# Patient Record
Sex: MALE | Race: WHITE | NOT HISPANIC OR LATINO | Employment: OTHER | URBAN - NONMETROPOLITAN AREA
[De-identification: names, ages, dates, MRNs, and addresses within clinical notes are randomized per-mention and may not be internally consistent; named-entity substitution may affect disease eponyms.]

---

## 2017-07-25 ENCOUNTER — TRANSCRIBE ORDERS (OUTPATIENT)
Dept: ADMINISTRATIVE | Facility: HOSPITAL | Age: 62
End: 2017-07-25

## 2017-07-25 ENCOUNTER — HOSPITAL ENCOUNTER (OUTPATIENT)
Dept: GENERAL RADIOLOGY | Facility: HOSPITAL | Age: 62
Discharge: HOME | End: 2017-07-25
Admitting: NURSE PRACTITIONER

## 2017-07-25 DIAGNOSIS — M25.562 LEFT KNEE PAIN, UNSPECIFIED CHRONICITY: ICD-10-CM

## 2017-07-25 DIAGNOSIS — M25.362 INSTABILITY OF KNEE JOINT, LEFT: ICD-10-CM

## 2017-07-25 DIAGNOSIS — M25.362 INSTABILITY OF KNEE JOINT, LEFT: Primary | ICD-10-CM

## 2017-07-25 PROCEDURE — 73562 X-RAY EXAM OF KNEE 3: CPT

## 2017-08-09 DIAGNOSIS — M25.562 PAIN IN BOTH KNEES, UNSPECIFIED CHRONICITY: Primary | ICD-10-CM

## 2017-08-09 DIAGNOSIS — M25.561 PAIN IN BOTH KNEES, UNSPECIFIED CHRONICITY: Primary | ICD-10-CM

## 2017-08-10 ENCOUNTER — OFFICE VISIT (OUTPATIENT)
Dept: ORTHOPEDIC SURGERY | Facility: CLINIC | Age: 62
End: 2017-08-10

## 2017-08-10 VITALS — RESPIRATION RATE: 16 BRPM | WEIGHT: 267.3 LBS | HEIGHT: 71 IN | BODY MASS INDEX: 37.42 KG/M2

## 2017-08-10 DIAGNOSIS — M17.10 OSTEOARTHROSIS, LOCALIZED, PRIMARY, KNEE, UNSPECIFIED LATERALITY: Primary | ICD-10-CM

## 2017-08-10 PROCEDURE — 99203 OFFICE O/P NEW LOW 30 MIN: CPT | Performed by: ORTHOPAEDIC SURGERY

## 2017-08-10 RX ORDER — DICLOFENAC SODIUM 75 MG/1
TABLET, DELAYED RELEASE ORAL
Refills: 1 | COMMUNITY
Start: 2017-07-20 | End: 2018-04-24

## 2017-08-10 RX ORDER — LISINOPRIL 10 MG/1
TABLET ORAL
Refills: 0 | COMMUNITY
Start: 2017-07-20 | End: 2018-04-24

## 2017-08-10 NOTE — PROGRESS NOTES
Subjective   Patient ID: Praveen King is a 62 y.o. male  Pain and Consult of the Left Knee (Patient is here today to be seen at the request of PCP.  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  Denies acute trauma locking buckling or giving way.  Denies paresthesias back or hip pain.  Has history of eczema, has Been given a prescription of Voltaren but not yet taken it by his PCP.    Review of Systems   Constitutional: Negative for diaphoresis, fever and unexpected weight change.   HENT: Negative for dental problem and sore throat.    Eyes: Negative for visual disturbance.   Respiratory: Negative for shortness of breath.    Cardiovascular: Negative for chest pain.   Gastrointestinal: Negative for abdominal pain, constipation, diarrhea, nausea and vomiting.   Genitourinary: Negative for difficulty urinating and frequency.   Musculoskeletal: Positive for arthralgias.   Neurological: Negative for headaches.   Hematological: Does not bruise/bleed easily.   All other systems reviewed and are negative.      Past Medical History:   Diagnosis Date   • Hypertension         Past Surgical History:   Procedure Laterality Date   • ADENOIDECTOMY     • NASAL FRACTURE CLOSED REDUCTION     • TONSILLECTOMY         Family History   Problem Relation Age of Onset   • Asthma Mother    • Cancer Father        Social History     Social History   • Marital status:      Spouse name: N/A   • Number of children: N/A   • Years of education: N/A     Occupational History   • Not on file.     Social History Main Topics   • Smoking status: Former Smoker     Years: 10.00   • Smokeless tobacco: Never Used   • Alcohol use No   • Drug use: No   • Sexual activity: Defer     Other Topics Concern   • Not on file  "    Social History Narrative       No Known Allergies    Objective   Resp 16  Ht 71\" (180.3 cm)  Wt 267 lb 4.8 oz (121 kg)  BMI 37.28 kg/m2   Physical Exam  Constitutional: He is oriented to person, place, and time. He appears well-developed and well-nourished.   HENT:   Head: Normocephalic and atraumatic.   Eyes: EOM are normal. Pupils are equal, round, and reactive to light.   Neck: Normal range of motion. Neck supple.   Cardiovascular: Normal rate.    Pulmonary/Chest: Effort normal and breath sounds normal.   Abdominal: Soft.   Neurological: He is alert and oriented to person, place, and time.   Skin: Skin is warm and dry.   Psychiatric: He has a normal mood and affect.   Nursing note and vitals reviewed.       Ortho Exam   Left and right knees:  Skin appears without erythema, normal standing mechanical alignment, full range of motion, negative signs of instability with negative Lachman and posterior drawer instability signs, negative medial and lateral collateral instability, Maria Luz sign negative, extensor mechanism nontender with good strength, negative patellofemoral crepitus, calf supple, no associated ankle edema, intact neurovascular status to the lower leg, no knee pain elicited with ipsilateral hip range of motion.  Assessment/Plan   Review of Radiographic Studies:    Radiographic images today of affected area I personally viewed and showed no sign of acute fracture or dislocation.      Procedures     Praveen was seen today for pain, consult and pain.    Diagnoses and all orders for this visit:    Osteoarthrosis, localized, primary, knee, unspecified laterality  -     Ambulatory Referral to Physical Therapy Evaluate and treat     Physical therapy referral given      Recommendations/Plan:   Work/Activity Status: May perform usual activities as tolerated    Patient agreeable to call or return sooner for any concerns.       Needed this time for further imaging, counseled patient regarding weight loss home " exercise and recommendation for quadriceps strengthening and hamstring strengthening left and right knees.      Impression:  Minimal osteoarthritic change left and right knees with quadriceps weakness  Plan:  Refer to therapy recheck in 6 weeks

## 2017-08-16 ENCOUNTER — APPOINTMENT (OUTPATIENT)
Dept: LAB | Facility: HOSPITAL | Age: 62
End: 2017-08-16

## 2017-08-16 ENCOUNTER — TRANSCRIBE ORDERS (OUTPATIENT)
Dept: ADMINISTRATIVE | Facility: HOSPITAL | Age: 62
End: 2017-08-16

## 2017-08-16 DIAGNOSIS — I15.9 SECONDARY HYPERTENSION: Primary | ICD-10-CM

## 2017-08-16 LAB
ALBUMIN SERPL-MCNC: 4.6 G/DL (ref 3.5–5)
ALBUMIN/GLOB SERPL: 1.5 G/DL (ref 1–2)
ALP SERPL-CCNC: 93 U/L (ref 38–126)
ALT SERPL W P-5'-P-CCNC: 79 U/L (ref 13–69)
ANION GAP SERPL CALCULATED.3IONS-SCNC: 17.2 MMOL/L
AST SERPL-CCNC: 38 U/L (ref 15–46)
BASOPHILS # BLD AUTO: 0.03 10*3/MM3 (ref 0–0.2)
BASOPHILS NFR BLD AUTO: 0.4 % (ref 0–2.5)
BILIRUB SERPL-MCNC: 0.8 MG/DL (ref 0.2–1.3)
BILIRUB UR QL STRIP: NEGATIVE
BUN BLD-MCNC: 11 MG/DL (ref 7–20)
BUN/CREAT SERPL: 18.3 (ref 6.3–21.9)
CALCIUM SPEC-SCNC: 9.9 MG/DL (ref 8.4–10.2)
CHLORIDE SERPL-SCNC: 102 MMOL/L (ref 98–107)
CHOLEST SERPL-MCNC: 220 MG/DL (ref 0–199)
CLARITY UR: ABNORMAL
CO2 SERPL-SCNC: 29 MMOL/L (ref 26–30)
COLOR UR: YELLOW
CREAT BLD-MCNC: 0.6 MG/DL (ref 0.6–1.3)
DEPRECATED RDW RBC AUTO: 46.2 FL (ref 37–54)
EOSINOPHIL # BLD AUTO: 0.09 10*3/MM3 (ref 0–0.7)
EOSINOPHIL NFR BLD AUTO: 1.2 % (ref 0–7)
ERYTHROCYTE [DISTWIDTH] IN BLOOD BY AUTOMATED COUNT: 13.6 % (ref 11.5–14.5)
GFR SERPL CREATININE-BSD FRML MDRD: 137 ML/MIN/1.73
GLOBULIN UR ELPH-MCNC: 3 GM/DL
GLUCOSE BLD-MCNC: 104 MG/DL (ref 74–98)
GLUCOSE UR STRIP-MCNC: NEGATIVE MG/DL
HCT VFR BLD AUTO: 48.9 % (ref 42–52)
HDLC SERPL-MCNC: 41 MG/DL (ref 40–60)
HGB BLD-MCNC: 16.1 G/DL (ref 14–18)
HGB UR QL STRIP.AUTO: NEGATIVE
IMM GRANULOCYTES # BLD: 0.03 10*3/MM3 (ref 0–0.06)
IMM GRANULOCYTES NFR BLD: 0.4 % (ref 0–0.6)
KETONES UR QL STRIP: NEGATIVE
LDLC SERPL CALC-MCNC: 154 MG/DL (ref 0–99)
LDLC/HDLC SERPL: 3.76 {RATIO}
LEUKOCYTE ESTERASE UR QL STRIP.AUTO: NEGATIVE
LYMPHOCYTES # BLD AUTO: 2.28 10*3/MM3 (ref 0.6–3.4)
LYMPHOCYTES NFR BLD AUTO: 30.7 % (ref 10–50)
MCH RBC QN AUTO: 30.3 PG (ref 27–31)
MCHC RBC AUTO-ENTMCNC: 32.9 G/DL (ref 30–37)
MCV RBC AUTO: 91.9 FL (ref 80–94)
MONOCYTES # BLD AUTO: 0.64 10*3/MM3 (ref 0–0.9)
MONOCYTES NFR BLD AUTO: 8.6 % (ref 0–12)
NEUTROPHILS # BLD AUTO: 4.35 10*3/MM3 (ref 2–6.9)
NEUTROPHILS NFR BLD AUTO: 58.7 % (ref 37–80)
NITRITE UR QL STRIP: NEGATIVE
NRBC BLD MANUAL-RTO: 0 /100 WBC (ref 0–0)
PH UR STRIP.AUTO: 5.5 [PH] (ref 5–8)
PLATELET # BLD AUTO: 337 10*3/MM3 (ref 130–400)
PMV BLD AUTO: 9.6 FL (ref 6–12)
POTASSIUM BLD-SCNC: 5.2 MMOL/L (ref 3.5–5.1)
PROT SERPL-MCNC: 7.6 G/DL (ref 6.3–8.2)
PROT UR QL STRIP: NEGATIVE
RBC # BLD AUTO: 5.32 10*6/MM3 (ref 4.7–6.1)
SODIUM BLD-SCNC: 143 MMOL/L (ref 137–145)
SP GR UR STRIP: 1.02 (ref 1–1.03)
TRIGL SERPL-MCNC: 125 MG/DL
TSH SERPL DL<=0.05 MIU/L-ACNC: 2.89 MIU/ML (ref 0.47–4.68)
UROBILINOGEN UR QL STRIP: ABNORMAL
VLDLC SERPL-MCNC: 25 MG/DL
WBC NRBC COR # BLD: 7.42 10*3/MM3 (ref 4.8–10.8)

## 2017-08-16 PROCEDURE — 84443 ASSAY THYROID STIM HORMONE: CPT | Performed by: NURSE PRACTITIONER

## 2017-08-16 PROCEDURE — 80053 COMPREHEN METABOLIC PANEL: CPT | Performed by: NURSE PRACTITIONER

## 2017-08-16 PROCEDURE — 80061 LIPID PANEL: CPT | Performed by: NURSE PRACTITIONER

## 2017-08-16 PROCEDURE — 85025 COMPLETE CBC W/AUTO DIFF WBC: CPT | Performed by: NURSE PRACTITIONER

## 2017-08-16 PROCEDURE — 81003 URINALYSIS AUTO W/O SCOPE: CPT | Performed by: NURSE PRACTITIONER

## 2017-08-16 PROCEDURE — 36415 COLL VENOUS BLD VENIPUNCTURE: CPT | Performed by: NURSE PRACTITIONER

## 2017-09-28 ENCOUNTER — OFFICE VISIT (OUTPATIENT)
Dept: ORTHOPEDIC SURGERY | Facility: CLINIC | Age: 62
End: 2017-09-28

## 2017-09-28 VITALS — RESPIRATION RATE: 16 BRPM | HEIGHT: 71 IN | WEIGHT: 260 LBS | BODY MASS INDEX: 36.4 KG/M2

## 2017-09-28 DIAGNOSIS — M25.562 PAIN IN BOTH KNEES, UNSPECIFIED CHRONICITY: Primary | ICD-10-CM

## 2017-09-28 DIAGNOSIS — M25.561 PAIN IN BOTH KNEES, UNSPECIFIED CHRONICITY: Primary | ICD-10-CM

## 2017-09-28 PROCEDURE — 99213 OFFICE O/P EST LOW 20 MIN: CPT | Performed by: ORTHOPAEDIC SURGERY

## 2017-09-28 RX ORDER — ATORVASTATIN CALCIUM 20 MG/1
TABLET, FILM COATED ORAL
Refills: 1 | COMMUNITY
Start: 2017-08-21 | End: 2018-04-24

## 2017-09-28 RX ORDER — LOSARTAN POTASSIUM 50 MG/1
TABLET ORAL
Refills: 1 | COMMUNITY
Start: 2017-09-19 | End: 2018-04-24

## 2017-09-28 NOTE — PROGRESS NOTES
"Subjective   Patient ID: Praveen King is a 62 y.o. male  Follow-up of the Right Knee and Follow-up of the Left Knee             History of Present Illness    Patient overall much improved still has some weakness when getting up from the floor otherwise not much difficulties is going up and downstairs no falls the loss of balance and no other medical issues.  Went to therapy and instructed in home exercise    Review of Systems   Constitutional: Negative for diaphoresis, fever and unexpected weight change.   HENT: Negative for dental problem and sore throat.    Eyes: Negative for visual disturbance.   Respiratory: Negative for shortness of breath.    Cardiovascular: Negative for chest pain.   Gastrointestinal: Negative for abdominal pain, constipation, diarrhea, nausea and vomiting.   Genitourinary: Negative for difficulty urinating and frequency.   Musculoskeletal: Positive for arthralgias.   Neurological: Negative for headaches.   Hematological: Does not bruise/bleed easily.   All other systems reviewed and are negative.      Past Medical History:   Diagnosis Date   • Hypertension         Past Surgical History:   Procedure Laterality Date   • ADENOIDECTOMY     • NASAL FRACTURE CLOSED REDUCTION     • TONSILLECTOMY         Family History   Problem Relation Age of Onset   • Asthma Mother    • Cancer Father        Social History     Social History   • Marital status:      Spouse name: N/A   • Number of children: N/A   • Years of education: N/A     Occupational History   • Not on file.     Social History Main Topics   • Smoking status: Former Smoker     Years: 10.00   • Smokeless tobacco: Never Used   • Alcohol use No   • Drug use: No   • Sexual activity: Defer     Other Topics Concern   • Not on file     Social History Narrative       All the above social hx, family hx, surgical history,medications, allergies, ros & HPI reviewed.    No Known Allergies    Objective   Resp 16  Ht 71\" (180.3 cm)  Wt 260 lb (118 kg) "  BMI 36.26 kg/m2   Physical Exam  Constitutional: He is oriented to person, place, and time. He appears well-developed and well-nourished.   HENT:   Head: Normocephalic and atraumatic.   Eyes: EOM are normal. Pupils are equal, round, and reactive to light.   Neck: Normal range of motion. Neck supple.   Cardiovascular: Normal rate.    Pulmonary/Chest: Effort normal and breath sounds normal.   Abdominal: Soft.   Neurological: He is alert and oriented to person, place, and time.   Skin: Skin is warm and dry.   Psychiatric: He has a normal mood and affect.   Nursing note and vitals reviewed.       Ortho Exam   Gait pattern appears normal, negative antalgic gait, difficulty getting out of a chair without assistance, able to toe raise on both legs independently 10 times    Assessment/Plan   Review of Radiographic Studies:    No new imaging done today.      Michael Morton was seen today for follow-up and follow-up.    Diagnoses and all orders for this visit:    Pain in both knees, unspecified chronicity       Physical therapy referral given      Recommendations/Plan:   Work/Activity Status: May perform usual activities as tolerated    Patient agreeable to call or return sooner for any concerns.             Impression:  Bilateral lower leg weakness  Plan:  Continue home strengthening recheck 3-4 months if not improved consider neurology evaluation at that time for her underlying myopathy or spinal stenosis

## 2018-01-18 ENCOUNTER — HOSPITAL ENCOUNTER (EMERGENCY)
Facility: HOSPITAL | Age: 63
Discharge: HOME | End: 2018-01-18
Attending: EMERGENCY MEDICINE | Admitting: EMERGENCY MEDICINE

## 2018-01-18 ENCOUNTER — APPOINTMENT (OUTPATIENT)
Dept: GENERAL RADIOLOGY | Facility: HOSPITAL | Age: 63
End: 2018-01-18

## 2018-01-18 VITALS
OXYGEN SATURATION: 97 % | HEIGHT: 72 IN | RESPIRATION RATE: 18 BRPM | TEMPERATURE: 98 F | HEART RATE: 99 BPM | SYSTOLIC BLOOD PRESSURE: 170 MMHG | BODY MASS INDEX: 35.76 KG/M2 | WEIGHT: 264 LBS | DIASTOLIC BLOOD PRESSURE: 90 MMHG

## 2018-01-18 DIAGNOSIS — S82.831A CLOSED FRACTURE OF DISTAL END OF RIGHT FIBULA, UNSPECIFIED FRACTURE MORPHOLOGY, INITIAL ENCOUNTER: Primary | ICD-10-CM

## 2018-01-18 PROCEDURE — 73610 X-RAY EXAM OF ANKLE: CPT

## 2018-01-18 PROCEDURE — 73590 X-RAY EXAM OF LOWER LEG: CPT

## 2018-01-18 PROCEDURE — 99283 EMERGENCY DEPT VISIT LOW MDM: CPT

## 2018-01-18 RX ORDER — HYDROCODONE BITARTRATE AND ACETAMINOPHEN 5; 325 MG/1; MG/1
1 TABLET ORAL EVERY 6 HOURS PRN
Qty: 12 TABLET | Refills: 0 | Status: SHIPPED | OUTPATIENT
Start: 2018-01-18 | End: 2018-04-24

## 2018-01-18 RX ORDER — ONDANSETRON 4 MG/1
4 TABLET, ORALLY DISINTEGRATING ORAL EVERY 8 HOURS PRN
Qty: 8 TABLET | Refills: 0 | Status: SHIPPED | OUTPATIENT
Start: 2018-01-18 | End: 2018-04-24

## 2018-01-18 RX ORDER — OXYCODONE HYDROCHLORIDE AND ACETAMINOPHEN 5; 325 MG/1; MG/1
1 TABLET ORAL ONCE
Status: COMPLETED | OUTPATIENT
Start: 2018-01-18 | End: 2018-01-18

## 2018-01-18 RX ORDER — ONDANSETRON 4 MG/1
4 TABLET, ORALLY DISINTEGRATING ORAL ONCE
Status: COMPLETED | OUTPATIENT
Start: 2018-01-18 | End: 2018-01-18

## 2018-01-18 RX ADMIN — OXYCODONE AND ACETAMINOPHEN 1 TABLET: 5; 325 TABLET ORAL at 17:52

## 2018-01-18 RX ADMIN — ONDANSETRON 4 MG: 4 TABLET, ORALLY DISINTEGRATING ORAL at 17:52

## 2018-01-18 NOTE — ED PROVIDER NOTES
Subjective   HPI Comments: Patient is here with complaint of pain right ankle twisted it walking down some steps inside the house this occurred about 5 hours ago no head neck or back injury no other injuries pain localized to the right ankle with discomfort trying to walk on the right ankle      Review of Systems   Constitutional: Negative.    HENT: Negative.    Eyes: Negative.    Respiratory: Negative.    Cardiovascular: Negative.         No chest pain reported   Gastrointestinal: Negative.    Musculoskeletal: Positive for arthralgias.   Skin: Negative.    Neurological: Negative.    Psychiatric/Behavioral: Negative.    All other systems reviewed and are negative.      Past Medical History:   Diagnosis Date   • Hypertension        No Known Allergies    Past Surgical History:   Procedure Laterality Date   • ADENOIDECTOMY     • NASAL FRACTURE CLOSED REDUCTION     • TONSILLECTOMY         Family History   Problem Relation Age of Onset   • Asthma Mother    • Cancer Father        Social History     Social History   • Marital status:      Spouse name: N/A   • Number of children: N/A   • Years of education: N/A     Social History Main Topics   • Smoking status: Former Smoker     Years: 10.00   • Smokeless tobacco: Never Used   • Alcohol use No   • Drug use: No   • Sexual activity: Defer     Other Topics Concern   • None     Social History Narrative           Objective   Physical Exam   Constitutional: He is oriented to person, place, and time. He appears well-developed and well-nourished.   Patient is afebrile nontoxic no acute distress...sitting in a wheelchair   HENT:   Head: Normocephalic and atraumatic.   Eyes: EOM are normal. Pupils are equal, round, and reactive to light.   Neck: Normal range of motion. Neck supple.   Cardiovascular: Regular rhythm and intact distal pulses.    Pulmonary/Chest: Effort normal and breath sounds normal.   Musculoskeletal: He exhibits edema and tenderness. He exhibits no deformity.     tenderness right lateral malleolus no tenderness proximally, right knee nontender neurovascular intact   Neurological: He is alert and oriented to person, place, and time. No cranial nerve deficit. He exhibits normal muscle tone.   Skin: Skin is warm and dry. No erythema.   Psychiatric: He has a normal mood and affect. His behavior is normal. Judgment and thought content normal.   Nursing note and vitals reviewed.      Splint - Cast - Strapping  Date/Time: 1/18/2018 6:44 PM  Performed by: SID ARANA  Authorized by: MARGA MATOS     Consent:     Consent obtained:  Verbal    Consent given by:  Patient  Pre-procedure details:     Sensation:  Normal  Procedure details:     Strapping: no      Splint type:  CAM walker boot  Post-procedure details:     Pain:  Improved    Sensation:  Normal    Patient tolerance of procedure:  Tolerated well, no immediate complications             ED Course  ED Course   Comment By Time   pt will follow-up with ortho tomorrow call office in the morning.. No weight bearing only toe-touch crutches cast boot Sid Arana PA-C 01/18 1844                  [facility]    Final diagnoses:   Closed fracture of distal end of right fibula, unspecified fracture morphology, initial encounter            Sid Arana PA-C  01/18/18 1847       Sid Arana PA-C  01/18/18 1940

## 2018-01-19 ENCOUNTER — OFFICE VISIT (OUTPATIENT)
Dept: ORTHOPEDIC SURGERY | Facility: CLINIC | Age: 63
End: 2018-01-19

## 2018-01-19 VITALS — RESPIRATION RATE: 16 BRPM | BODY MASS INDEX: 35.89 KG/M2 | HEIGHT: 72 IN | WEIGHT: 265 LBS

## 2018-01-19 DIAGNOSIS — S82.64XA CLOSED NONDISPLACED FRACTURE OF LATERAL MALLEOLUS OF RIGHT FIBULA, INITIAL ENCOUNTER: Primary | ICD-10-CM

## 2018-01-19 PROCEDURE — 99214 OFFICE O/P EST MOD 30 MIN: CPT | Performed by: ORTHOPAEDIC SURGERY

## 2018-01-19 RX ORDER — LOSARTAN POTASSIUM 100 MG/1
TABLET ORAL
COMMUNITY
Start: 2017-11-13 | End: 2021-06-07 | Stop reason: CLARIF

## 2018-01-19 NOTE — PROGRESS NOTES
Subjective   Patient ID: Praveen King is a 62 y.o. male  Pain of the Right Ankle (Patient sustained injury at home 1/18/18 after tripping and falling on the stairs.)             History of Present Illness  Chief complaint is right lateral ankle pain with swelling after a fall down stairs falling backward denies loss of consciousness chest pain or syncopal episode.  Felt as though his knee was little squishy after he fell but since then has had no pain in the knee no swelling bruising.  X-rays of the ankle were done showing a nondisplaced lateral Roy fracture was given a boot comes in today for follow-up.  He currently was doing therapy for weakness in both legs strengthening both legs and having less complaints of weakness prior to his fall stairs.  Denies current back pain paresthesias open wounds or medial sided right ankle pain.      Review of Systems   Constitutional: Negative for diaphoresis, fever and unexpected weight change.   HENT: Negative for dental problem and sore throat.    Eyes: Negative for visual disturbance.   Respiratory: Negative for shortness of breath.    Cardiovascular: Negative for chest pain.   Gastrointestinal: Negative for abdominal pain, constipation, diarrhea, nausea and vomiting.   Genitourinary: Negative for difficulty urinating and frequency.   Musculoskeletal: Positive for arthralgias.   Neurological: Negative for headaches.   Hematological: Does not bruise/bleed easily.   All other systems reviewed and are negative.      Past Medical History:   Diagnosis Date   • Hypertension         Past Surgical History:   Procedure Laterality Date   • ADENOIDECTOMY     • NASAL FRACTURE CLOSED REDUCTION     • TONSILLECTOMY         Family History   Problem Relation Age of Onset   • Asthma Mother    • Cancer Father        Social History     Social History   • Marital status:      Spouse name: N/A   • Number of children: N/A   • Years of education: N/A     Occupational History   • Not on  "file.     Social History Main Topics   • Smoking status: Former Smoker     Years: 10.00   • Smokeless tobacco: Never Used   • Alcohol use No   • Drug use: No   • Sexual activity: Defer     Other Topics Concern   • Not on file     Social History Narrative       I have reviewed all of the above social hx, family hx, surgical hx, medications, allergies & ROS and confirm that it is accurate.    No Known Allergies    Objective   Resp 16  Ht 182.9 cm (72.01\")  Wt 120 kg (265 lb)  BMI 35.93 kg/m2   Physical Exam  Constitutional: Patient is oriented to person, place, and time. Patient appears well-developed and well-nourished.   HENT:Head: Normocephalic and atraumatic.   Eyes: EOM are normal. Pupils are equal, round, and reactive to light.   Neck: Normal range of motion. Neck supple.   Cardiovascular: Normal rate.    Pulmonary/Chest: Effort normal and breath sounds normal.   Abdominal: Soft.   Neurological: Patient is alert and oriented to person, place, and time.   Skin: Skin is warm and dry.   Psychiatric: Patient has a normal mood and affect.   Nursing note and vitals reviewed.       Ortho Exam     Right ankle with tenderness swelling ecchymosis of the distal lateral malleolar area, no focal swelling ecchymosis bruising or tenderness in the medial malleolar area, several small superficial varicosities on the medial side of the ankle, mid and forefoot areas nontender Achilles tendon intact calf supple    Right knee with no effusion ecchymosis contusions or abrasions full active knee extension Maria Luz sign negative for medial lateral joint line pain no sign of ligamentous instability negative Lockman sign.  Assessment/Plan   Review of Radiographic Studies:    Radiographic images today of fracture I personally viewed and confirm satisfactory alignment.      Procedures     Praveen was seen today for pain.    Diagnoses and all orders for this visit:    Closed nondisplaced fracture of lateral malleolus of right fibula, " initial encounter     Orthopedic activities reviewed and patient expressed appreciation, Risk, benefits, and merits of treatment alternatives reviewed with the patient and questions answered and Use brace as instructed      Recommendations/Plan:   Work/Activity Status: Nonweightbearing to right lower leg    Patient agreeable to call or return sooner for any concerns.             Impression:  Distal fibular fracture nondisplaced intact mortise neurovascularly intact, history of bilateral leg weakness  Plan:  Nonweightbearing, follow-up 1 week right ankle x-rays at that time, continue with walking boot ice elevate nonweightbearing status

## 2018-01-22 DIAGNOSIS — M25.571 RIGHT ANKLE PAIN, UNSPECIFIED CHRONICITY: Primary | ICD-10-CM

## 2018-01-23 ENCOUNTER — OFFICE VISIT (OUTPATIENT)
Dept: ORTHOPEDIC SURGERY | Facility: CLINIC | Age: 63
End: 2018-01-23

## 2018-01-23 VITALS — WEIGHT: 266 LBS | RESPIRATION RATE: 18 BRPM | BODY MASS INDEX: 36.03 KG/M2 | HEIGHT: 72 IN

## 2018-01-23 DIAGNOSIS — S82.64XD CLOSED NONDISPLACED FRACTURE OF LATERAL MALLEOLUS OF RIGHT FIBULA WITH ROUTINE HEALING, SUBSEQUENT ENCOUNTER: Primary | ICD-10-CM

## 2018-01-23 PROCEDURE — 99213 OFFICE O/P EST LOW 20 MIN: CPT | Performed by: ORTHOPAEDIC SURGERY

## 2018-01-23 NOTE — PROGRESS NOTES
Subjective   Patient ID: Praveen King is a 62 y.o. male  Follow-up of the Right Ankle             History of Present Illness    Right ankle pain continues on the lateral side some bruising extending on the medial side up the shin area has chronic bump in the midfoot which bothersome wearing boots ever since he had an injury there as a child.  Has been tolerant of his boot compliant with instructions nonweightbearing.    Review of Systems   Constitutional: Negative for fever.   HENT: Negative for voice change.    Eyes: Negative for visual disturbance.   Respiratory: Negative for shortness of breath.    Cardiovascular: Negative for chest pain.   Gastrointestinal: Negative for abdominal distention and abdominal pain.   Genitourinary: Negative for dysuria.   Musculoskeletal: Positive for arthralgias. Negative for gait problem and joint swelling.   Skin: Negative for rash.   Neurological: Negative for speech difficulty.   Hematological: Does not bruise/bleed easily.   Psychiatric/Behavioral: Negative for confusion.       Past Medical History:   Diagnosis Date   • Hypertension         Past Surgical History:   Procedure Laterality Date   • ADENOIDECTOMY     • NASAL FRACTURE CLOSED REDUCTION     • TONSILLECTOMY         Family History   Problem Relation Age of Onset   • Asthma Mother    • Cancer Father        Social History     Social History   • Marital status:      Spouse name: N/A   • Number of children: N/A   • Years of education: N/A     Occupational History   • Not on file.     Social History Main Topics   • Smoking status: Former Smoker     Years: 10.00   • Smokeless tobacco: Never Used   • Alcohol use No   • Drug use: No   • Sexual activity: Defer     Other Topics Concern   • Not on file     Social History Narrative       I have reviewed all of the above social hx, family hx, surgical hx, medications, allergies & ROS and confirm that it is accurate.    No Known Allergies    Objective   Resp 18  Ht 182.9 cm  "(72.01\")  Wt 121 kg (266 lb)  BMI 36.07 kg/m2   Physical Exam  Constitutional: Patient is oriented to person, place, and time. Patient appears well-developed and well-nourished.   HENT:Head: Normocephalic and atraumatic.   Eyes: EOM are normal. Pupils are equal, round, and reactive to light.   Neck: Normal range of motion. Neck supple.   Cardiovascular: Normal rate.    Pulmonary/Chest: Effort normal and breath sounds normal.   Abdominal: Soft.   Neurological: Patient is alert and oriented to person, place, and time.   Skin: Skin is warm and dry.   Psychiatric: Patient has a normal mood and affect.   Nursing note and vitals reviewed.       Ortho Exam   Right ankle with tenderness of the lateral malleolar area, no medial malleolar tenderness, ecchymosis is seen on the medial distal shin area but no focal tenderness to palpation.  Forefoot neurovascularly intact Achilles tendon intact nontender.    Assessment/Plan   Review of Radiographic Studies:    Radiographic images today of fracture I personally viewed and confirm satisfactory alignment.      Procedures     Praveen was seen today for follow-up.    Diagnoses and all orders for this visit:    Closed nondisplaced fracture of lateral malleolus of right fibula with routine healing, subsequent encounter     Use brace as instructed      Recommendations/Plan:   Work/Activity Status: Nonweightbearing status to right lower leg    Patient agreeable to call or return sooner for any concerns.             Impression: Nondisplaced distal lateral malleolar fracture right ankle with reduced mortise   Plan: Follow-up in 4-6 weeks x-rays right ankle refer to therapy at that time    "

## 2018-02-26 DIAGNOSIS — S82.64XD CLOSED NONDISPLACED FRACTURE OF LATERAL MALLEOLUS OF RIGHT FIBULA WITH ROUTINE HEALING, SUBSEQUENT ENCOUNTER: Primary | ICD-10-CM

## 2018-02-27 ENCOUNTER — OFFICE VISIT (OUTPATIENT)
Dept: ORTHOPEDIC SURGERY | Facility: CLINIC | Age: 63
End: 2018-02-27

## 2018-02-27 VITALS — BODY MASS INDEX: 34.63 KG/M2 | RESPIRATION RATE: 16 BRPM | WEIGHT: 255.7 LBS | HEIGHT: 72 IN

## 2018-02-27 DIAGNOSIS — S82.64XD CLOSED NONDISPLACED FRACTURE OF LATERAL MALLEOLUS OF RIGHT FIBULA WITH ROUTINE HEALING, SUBSEQUENT ENCOUNTER: Primary | ICD-10-CM

## 2018-02-27 PROCEDURE — 99213 OFFICE O/P EST LOW 20 MIN: CPT | Performed by: ORTHOPAEDIC SURGERY

## 2018-02-27 NOTE — PROGRESS NOTES
"Subjective   Patient ID: Praveen King is a 62 y.o. male  Follow-up of the Right Ankle             History of Present Illness     Almost no pain in the ankle no swelling very comfortable once again out of his boot get back to work.     Review of Systems   Constitutional: Negative for diaphoresis, fever and unexpected weight change.   HENT: Negative for dental problem and sore throat.    Eyes: Negative for visual disturbance.   Respiratory: Negative for shortness of breath.    Cardiovascular: Negative for chest pain.   Gastrointestinal: Negative for abdominal pain, constipation, diarrhea, nausea and vomiting.   Genitourinary: Negative for difficulty urinating and frequency.   Musculoskeletal: Positive for arthralgias.   Neurological: Negative for headaches.   Hematological: Does not bruise/bleed easily.   All other systems reviewed and are negative.      Past Medical History:   Diagnosis Date   • Hypertension         Past Surgical History:   Procedure Laterality Date   • ADENOIDECTOMY     • NASAL FRACTURE CLOSED REDUCTION     • TONSILLECTOMY         Family History   Problem Relation Age of Onset   • Asthma Mother    • Cancer Father        Social History     Social History   • Marital status:      Spouse name: N/A   • Number of children: N/A   • Years of education: N/A     Occupational History   • Not on file.     Social History Main Topics   • Smoking status: Former Smoker     Years: 10.00   • Smokeless tobacco: Never Used   • Alcohol use No   • Drug use: No   • Sexual activity: Defer     Other Topics Concern   • Not on file     Social History Narrative       I have reviewed all of the above social hx, family hx, surgical hx, medications, allergies & ROS and confirm that it is accurate.    No Known Allergies    Objective   Resp 16  Ht 182.9 cm (72.01\")  Wt 116 kg (255 lb 11.2 oz)  BMI 34.67 kg/m2   Physical Exam  Constitutional: Patient is oriented to person, place, and time. Patient appears well-developed " and well-nourished.   HENT:Head: Normocephalic and atraumatic.   Eyes: EOM are normal. Pupils are equal, round, and reactive to light.   Neck: Normal range of motion. Neck supple.   Cardiovascular: Normal rate.    Pulmonary/Chest: Effort normal and breath sounds normal.   Abdominal: Soft.   Neurological: Patient is alert and oriented to person, place, and time.   Skin: Skin is warm and dry.   Psychiatric: Patient has a normal mood and affect.   Nursing note and vitals reviewed.       Ortho Exam   No tenderness no swelling good range of motion no medial malleolar swelling or tenderness for foot neurovascularly intact    Assessment/Plan   Review of Radiographic Studies:    Radiographic images today of fracture I personally viewed and confirm satisfactory alignment.      Procedures     Praveen was seen today for follow-up.    Diagnoses and all orders for this visit:    Closed nondisplaced fracture of lateral malleolus of right fibula with routine healing, subsequent encounter  -     Ambulatory Referral to Physical Therapy Evaluate and treat     Physical therapy referral given      Recommendations/Plan:   Work/Activity Status: Discussed work activities    Patient agreeable to call or return sooner for any concerns.             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

## 2018-04-10 ENCOUNTER — OFFICE VISIT (OUTPATIENT)
Dept: ORTHOPEDIC SURGERY | Facility: CLINIC | Age: 63
End: 2018-04-10

## 2018-04-10 DIAGNOSIS — S82.64XD CLOSED NONDISPLACED FRACTURE OF LATERAL MALLEOLUS OF RIGHT FIBULA WITH ROUTINE HEALING, SUBSEQUENT ENCOUNTER: Primary | ICD-10-CM

## 2018-04-10 PROCEDURE — 99213 OFFICE O/P EST LOW 20 MIN: CPT | Performed by: ORTHOPAEDIC SURGERY

## 2018-04-10 NOTE — PROGRESS NOTES
Subjective   Patient ID: Praveen King is a 62 y.o. male  Follow-up of the Right Ankle (Closed nondisplaced fracture of lateral malleolus of right fibula with routine healing. Patient states pain in right ankle has improved. Right hand dominant)             History of Present Illness    Continues to progress improve with ankle strength range of motion no swelling very satisfied with range of motion and progress in therapy.    Review of Systems   Constitutional: Negative for diaphoresis, fever and unexpected weight change.   HENT: Negative for dental problem and sore throat.    Eyes: Negative for visual disturbance.   Respiratory: Negative for shortness of breath.    Cardiovascular: Negative for chest pain.   Gastrointestinal: Negative for abdominal pain, constipation, diarrhea, nausea and vomiting.   Genitourinary: Negative for difficulty urinating and frequency.   Neurological: Negative for headaches.   Hematological: Does not bruise/bleed easily.   All other systems reviewed and are negative.      Past Medical History:   Diagnosis Date   • Hypertension         Past Surgical History:   Procedure Laterality Date   • ADENOIDECTOMY     • NASAL FRACTURE CLOSED REDUCTION     • TONSILLECTOMY         Family History   Problem Relation Age of Onset   • Asthma Mother    • Cancer Father        Social History     Social History   • Marital status:      Spouse name: N/A   • Number of children: N/A   • Years of education: N/A     Occupational History   • Not on file.     Social History Main Topics   • Smoking status: Former Smoker     Years: 10.00   • Smokeless tobacco: Never Used   • Alcohol use No   • Drug use: No   • Sexual activity: Defer     Other Topics Concern   • Not on file     Social History Narrative   • No narrative on file       I have reviewed all of the above social hx, family hx, surgical hx, medications, allergies & ROS and confirm that it is accurate.    No Known Allergies    Objective   There were no  vitals taken for this visit.   Physical Exam  Constitutional: Patient is oriented to person, place, and time. Patient appears well-developed and well-nourished.   HENT:Head: Normocephalic and atraumatic.   Eyes: EOM are normal. Pupils are equal, round, and reactive to light.   Neck: Normal range of motion. Neck supple.   Cardiovascular: Normal rate.    Pulmonary/Chest: Effort normal and breath sounds normal.   Abdominal: Soft.   Neurological: Patient is alert and oriented to person, place, and time.   Skin: Skin is warm and dry.   Psychiatric: Patient has a normal mood and affect.   Nursing note and vitals reviewed.       Ortho Exam   Right ankle with no instability good range of motion and very slight tenderness to mid substance Achilles region with no swelling full plantar flexion dorsiflexion.    Assessment/Plan   Review of Radiographic Studies:    No new imaging done today.      Procedures     Praveen was seen today for follow-up.    Diagnoses and all orders for this visit:    Closed nondisplaced fracture of lateral malleolus of right fibula with routine healing, subsequent encounter       Orthopedic activities reviewed and patient expressed appreciation      Recommendations/Plan:   Exercise, medications, injections, other patient advice, and return appointment as noted.    Patient agreeable to call or return sooner for any concerns.       Patient discharged from formal therapy when independent      Impression:  Right ankle healed lateral malleolar fracture, dorsal midfoot cyst  Plan:  Dr. Vogel to evaluate for foot cyst removal as needed, orthopedic follow-up when necessary for ankle injury

## 2018-04-23 DIAGNOSIS — M79.671 RIGHT FOOT PAIN: Primary | ICD-10-CM

## 2018-04-24 ENCOUNTER — OFFICE VISIT (OUTPATIENT)
Dept: ORTHOPEDIC SURGERY | Facility: CLINIC | Age: 63
End: 2018-04-24

## 2018-04-24 VITALS — HEIGHT: 72 IN | BODY MASS INDEX: 35.89 KG/M2 | RESPIRATION RATE: 12 BRPM | WEIGHT: 265 LBS

## 2018-04-24 DIAGNOSIS — M19.079 ARTHRITIS OF FOOT: Primary | ICD-10-CM

## 2018-04-24 PROCEDURE — 99203 OFFICE O/P NEW LOW 30 MIN: CPT | Performed by: PODIATRIST

## 2018-04-24 RX ORDER — MELOXICAM 7.5 MG/1
7.5 TABLET ORAL DAILY
Qty: 30 TABLET | Refills: 2 | Status: SHIPPED | OUTPATIENT
Start: 2018-04-24 | End: 2021-06-07

## 2018-04-24 NOTE — PROGRESS NOTES
Subjective   Patient ID: Praveen King is a 62 y.o. male   Pain of the Right Foot  He presents today with a painful area on the top of his right foot.  He states he's had this for years.  He's been being seen for his right ankle as well.  He is undergoing rehabilitation for that.  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  He's not really had much in the way of treatment.  Takes no medication.  Has had no injections and no padding.  Says the pain is fairly constant and aching and throbbing.  Resting and not wearing shoes does help some.    History of Present Illness    Pain Score: 6  Pain Location: Foot  Pain Orientation: Right     Pain Descriptors: Aching, Throbbing (swelling, stiffness)  Pain Frequency: Constant/continuous  Pain Onset: Ongoing  Date Pain First Started:  (several years ago)     Aggravating Factors: Standing        Pain Intervention(s): Rest, Other (Comment) (physical therapy)          Review of Systems   Constitutional: Negative for diaphoresis, fever and unexpected weight change.   HENT: Negative for dental problem and sore throat.    Eyes: Negative for visual disturbance.   Respiratory: Negative for shortness of breath.    Cardiovascular: Negative for chest pain.   Gastrointestinal: Negative for abdominal pain, constipation, diarrhea, nausea and vomiting.   Genitourinary: Negative for difficulty urinating and frequency.   Neurological: Negative for headaches.   Hematological: Does not bruise/bleed easily.   All other systems reviewed and are negative.      Past Medical History:   Diagnosis Date   • Hypertension         Past Surgical History:   Procedure Laterality Date   • ADENOIDECTOMY     • NASAL FRACTURE CLOSED REDUCTION     • TONSILLECTOMY         No Known Allergies      Current Outpatient Prescriptions:   •  losartan (COZAAR) 100 MG tablet, , Disp: , Rfl:   •  diclofenac (VOLTAREN) 1 % gel gel, Apply 4 g  topically 4 (Four) Times a Day., Disp: 1 tube, Rfl: 3  •  meloxicam (MOBIC) 7.5 MG tablet, Take 1 tablet by mouth Daily., Disp: 30 tablet, Rfl: 2    Family History   Problem Relation Age of Onset   • Asthma Mother    • Cancer Father        Social History     Social History   • Marital status:      Spouse name: N/A   • Number of children: N/A   • Years of education: N/A     Occupational History   • Not on file.     Social History Main Topics   • Smoking status: Former Smoker     Years: 10.00   • Smokeless tobacco: Never Used   • Alcohol use No   • Drug use: No   • Sexual activity: Defer     Other Topics Concern   • Not on file     Social History Narrative   • No narrative on file       Counseling given: Not Answered       I have reviewed all of the above social hx, family hx, surgical hx, medications, allergies & ROS and confirm that it is accurate.  Objective   Physical Exam   Constitutional: He is oriented to person, place, and time. He appears well-developed and well-nourished.   HENT:   Head: Normocephalic and atraumatic.   Nose: Nose normal.   Eyes: Conjunctivae and EOM are normal. Pupils are equal, round, and reactive to light.   Neck: Normal range of motion.   Pulmonary/Chest: Effort normal.   Neurological: He is alert and oriented to person, place, and time.   Skin: Skin is warm.   Psychiatric: He has a normal mood and affect. His behavior is normal. Judgment and thought content normal.   Nursing note and vitals reviewed.    Right Ankle Exam   Right ankle exam is normal.  Swelling: none    Range of Motion   The patient has normal right ankle ROM.    Muscle Strength   The patient has normal right ankle strength.  Other   Sensation: normal  Pulse: present     Comments:  Mild edema and erythema with pain to the midfoot, there is pain with rom and manipulation of the midfoot  There is no instability          Right Ankle Exam     Range of Motion   Normal right ankle ROM    Muscle Strength   Normal right  ankle strengthComments  Mild edema and erythema with pain to the midfoot, there is pain with rom and manipulation of the midfoot  There is no instability        Maintains a medial longitudinal arch upon weightbearing with a cavovarus foot type.  He has a very sharp prominent bony spike over the dorsal first metatarsocuneiform joint.    Assessment/Plan right midfoot arthritis with dorsal exostosis, cavus foot  Independent Review of Radiographic Studies:      Laboratory and Other Studies:     Medical Decision Making:        Procedures  [] No procedures were performed in office today.    Praveen was seen today for pain.    Diagnoses and all orders for this visit:    Arthritis of foot    Other orders  -     meloxicam (MOBIC) 7.5 MG tablet; Take 1 tablet by mouth Daily.  -     diclofenac (VOLTAREN) 1 % gel gel; Apply 4 g topically 4 (Four) Times a Day.          Recommendations/Plan:  I discussed treatment options with him and reviewed his x-rays with him.  I discussed conservative therapy consisting of circular padding, shoe modifications and lacing modifications.  We also discussed oral and topical medications as well as injection.  As a last resort surgically I discussed exostectomy.  He states he's had this for many years and thinks he can continue to live with this.  He says if it worsens or anything changes he'll let me know.  I'll prescribe him oral meloxicam and topical Voltaren gel.  We discussed stretching issues if needed.  Return if symptoms worsen or fail to improve.  Patient agreeable to call or return sooner for any concerns.

## 2019-10-17 ENCOUNTER — TRANSCRIBE ORDERS (OUTPATIENT)
Dept: ULTRASOUND IMAGING | Facility: HOSPITAL | Age: 64
End: 2019-10-17

## 2019-10-17 DIAGNOSIS — R29.898 LEG WEAKNESS, BILATERAL: Primary | ICD-10-CM

## 2019-10-22 ENCOUNTER — HOSPITAL ENCOUNTER (OUTPATIENT)
Dept: ULTRASOUND IMAGING | Facility: HOSPITAL | Age: 64
Discharge: HOME | End: 2019-10-22
Admitting: NURSE PRACTITIONER

## 2019-10-22 DIAGNOSIS — R29.898 LEG WEAKNESS, BILATERAL: ICD-10-CM

## 2019-10-22 PROCEDURE — 93922 UPR/L XTREMITY ART 2 LEVELS: CPT

## 2020-05-05 NOTE — PROGRESS NOTES
Patient: Praveen King    YOB: 1955    Date: 05/07/2020    Primary Care Provider: Frieda Dominguez APRN    Chief Complaint   Patient presents with   • Abnormal Lab     elevated LFTs        SUBJECTIVE:    History of present illness: Patient with a history of abnormal LFTs and now more recently about 2 to 3 months ago underwent repeat blood work and found persistent LFT abnormality.  He subsequently went for further evaluation with an ultrasound was found to have cholelithiasis.  Patient has never had any abdominal pain.  No flank pain.  No atypical chest pain.  No nausea or vomiting.    The following portions of the patient's history were reviewed and updated as appropriate: allergies, current medications, past family history, past medical history, past social history, past surgical history and problem list.    Review of Systems   Constitutional: Negative for chills, fever and unexpected weight change.   HENT: Negative for trouble swallowing and voice change.    Eyes: Negative for visual disturbance.   Respiratory: Negative for apnea, cough, chest tightness, shortness of breath and wheezing.    Cardiovascular: Negative for chest pain, palpitations and leg swelling.   Gastrointestinal: Negative for abdominal distention, abdominal pain, anal bleeding, blood in stool, constipation, diarrhea, nausea, rectal pain and vomiting.   Endocrine: Negative for cold intolerance and heat intolerance.   Genitourinary: Negative for difficulty urinating, dysuria, flank pain, scrotal swelling and testicular pain.   Musculoskeletal: Negative for back pain, gait problem and joint swelling.   Skin: Negative for color change, rash and wound.   Neurological: Negative for dizziness, syncope, speech difficulty, weakness, numbness and headaches.   Hematological: Negative for adenopathy. Does not bruise/bleed easily.   Psychiatric/Behavioral: Negative for confusion. The patient is not nervous/anxious.        History:  Past  "Medical History:   Diagnosis Date   • Hypertension        Past Surgical History:   Procedure Laterality Date   • ADENOIDECTOMY     • NASAL FRACTURE CLOSED REDUCTION     • PROSTATE SURGERY     • TONSILLECTOMY         Family History   Problem Relation Age of Onset   • Asthma Mother    • Cancer Father        Social History     Tobacco Use   • Smoking status: Former Smoker     Years: 10.00   • Smokeless tobacco: Never Used   Substance Use Topics   • Alcohol use: No   • Drug use: No       Allergies:  No Known Allergies    Medications:    Current Outpatient Medications:   •  amLODIPine (NORVASC) 10 MG tablet, , Disp: , Rfl:   •  hydroCHLOROthiazide (HYDRODIURIL) 25 MG tablet, , Disp: , Rfl:   •  losartan (COZAAR) 100 MG tablet, , Disp: , Rfl:   •  diclofenac (VOLTAREN) 1 % gel gel, Apply 4 g topically 4 (Four) Times a Day., Disp: 1 tube, Rfl: 3  •  meloxicam (MOBIC) 7.5 MG tablet, Take 1 tablet by mouth Daily., Disp: 30 tablet, Rfl: 2    OBJECTIVE:    Vital Signs:   Vitals:    05/07/20 1254   BP: 140/82   Pulse: 104   Temp: 98 °F (36.7 °C)   TempSrc: Temporal   SpO2: 97%   Weight: 123 kg (271 lb 3.2 oz)   Height: 182.9 cm (72\")       Physical Exam:   General Appearance:    Alert, cooperative, in no acute distress   Head:    Normocephalic, without obvious abnormality, atraumatic   Eyes:            Normal.  No scleral icterus.  PERRLA    Lungs:     Clear to auscultation,respirations regular, even and                  unlabored    Heart:    Regular rhythm and normal rate, normal S1 and S2, no            murmur   Abdomen:     Normal bowel sounds, no masses, no organomegaly, soft        non-tender, non-distended, no guarding, obese   Extremities:   Moves all extremities well, no edema, no cyanosis, no             redness   Skin:   No bleeding, bruising or rash   Neurologic:   Normal without gross deficits.   Psychiatric: No evidence of depression or anxiety        Results Review:   I reviewed the patient's new clinical " results.  Ultrasound and labs were reviewed    Review of Systems was reviewed and confirmed as accurate as documented by the MA.    ASSESSMENT/PLAN:    1. Calculus of gallbladder without cholecystitis without obstruction    2. Abnormal LFTs        Patient with asymptomatic cholelithiasis and minor LFT changes may be related to his hepato-steatosis.  No evidence of any gallbladder/gallstone issues at this time.  I recommend observation for now with low-fat diet.  I explained to him if he begins to have any symptoms including abdominal pain or back/flank pain, atypical chest pain, nausea vomiting etc. he should follow-up with me for reevaluation.  Weight loss would be helpful.  He agrees with the above.  He understands that gallstones can cause other issues including cholecystitis, ductal obstruction etc. At this time he does wish to observe and does not want to proceed with surgery.  Otherwise follow-up with me as needed.    Electronically signed by Ronnie Cabral MD  05/07/20        Portions of this note have been scribed for Ronnie Cabral MD by Nupur Love 5/7/2020  13:37

## 2020-05-07 ENCOUNTER — OFFICE VISIT (OUTPATIENT)
Dept: SURGERY | Facility: CLINIC | Age: 65
End: 2020-05-07

## 2020-05-07 VITALS
BODY MASS INDEX: 36.73 KG/M2 | SYSTOLIC BLOOD PRESSURE: 140 MMHG | HEART RATE: 104 BPM | WEIGHT: 271.2 LBS | OXYGEN SATURATION: 97 % | HEIGHT: 72 IN | DIASTOLIC BLOOD PRESSURE: 82 MMHG | TEMPERATURE: 98 F

## 2020-05-07 DIAGNOSIS — R79.89 ABNORMAL LFTS: ICD-10-CM

## 2020-05-07 DIAGNOSIS — K80.20 CALCULUS OF GALLBLADDER WITHOUT CHOLECYSTITIS WITHOUT OBSTRUCTION: Primary | ICD-10-CM

## 2020-05-07 PROCEDURE — 99243 OFF/OP CNSLTJ NEW/EST LOW 30: CPT | Performed by: SURGERY

## 2020-05-07 RX ORDER — AMLODIPINE BESYLATE 10 MG/1
TABLET ORAL
COMMUNITY
Start: 2020-05-04

## 2020-05-07 RX ORDER — HYDROCHLOROTHIAZIDE 25 MG/1
TABLET ORAL
COMMUNITY
Start: 2020-05-05 | End: 2021-06-07

## 2021-06-07 ENCOUNTER — OFFICE VISIT (OUTPATIENT)
Dept: ORTHOPEDIC SURGERY | Facility: CLINIC | Age: 66
End: 2021-06-07

## 2021-06-07 VITALS — HEIGHT: 72 IN | RESPIRATION RATE: 18 BRPM | WEIGHT: 275 LBS | BODY MASS INDEX: 37.25 KG/M2

## 2021-06-07 DIAGNOSIS — M54.41 LOW BACK PAIN WITH RIGHT-SIDED SCIATICA, UNSPECIFIED BACK PAIN LATERALITY, UNSPECIFIED CHRONICITY: ICD-10-CM

## 2021-06-07 DIAGNOSIS — M51.36 DDD (DEGENERATIVE DISC DISEASE), LUMBAR: ICD-10-CM

## 2021-06-07 DIAGNOSIS — M25.551 ARTHRALGIA OF RIGHT HIP: Primary | ICD-10-CM

## 2021-06-07 DIAGNOSIS — R20.2 NUMBNESS AND TINGLING OF RIGHT LEG: ICD-10-CM

## 2021-06-07 DIAGNOSIS — R20.0 NUMBNESS AND TINGLING OF RIGHT LEG: ICD-10-CM

## 2021-06-07 PROCEDURE — 99203 OFFICE O/P NEW LOW 30 MIN: CPT | Performed by: PHYSICIAN ASSISTANT

## 2021-06-07 NOTE — PROGRESS NOTES
"Subjective   Patient ID: Praveen King is a 65 y.o. right hand dominant male  Pain of the Right Hip (Reports a couple of falls back in the winter, no treatment, hip pain that radiates down to the foot with occasional numbness, reports some low back pain, no tx)         History of Present Illness  Patient presents as new patient with complaints of multiple falls due to the right lower leg feeling numb and \"giving out\" He states this has been ongoing x 5 - 6 months. He has resorted to  Using a cane. Patient does have occasional low back pain with no known history of Lumbar ddd.   Denies bowel or bladder incontinence.                                                  Past Medical History:   Diagnosis Date   • Fracture of ankle 2018    treated by Rafael Vogel DPM with fx boot   • Hypertension    • Sleep apnea, obstructive     uses CPAP        Past Surgical History:   Procedure Laterality Date   • ADENOIDECTOMY     • NASAL FRACTURE CLOSED REDUCTION     • PROSTATE SURGERY     • TONSILLECTOMY         Family History   Problem Relation Age of Onset   • Asthma Mother    • Cancer Father        Social History     Socioeconomic History   • Marital status:      Spouse name: Not on file   • Number of children: Not on file   • Years of education: Not on file   • Highest education level: Not on file   Tobacco Use   • Smoking status: Former Smoker     Years: 10.00     Types: Cigarettes     Quit date:      Years since quittin.4   • Smokeless tobacco: Never Used   Vaping Use   • Vaping Use: Never used   Substance and Sexual Activity   • Alcohol use: No   • Drug use: No   • Sexual activity: Defer         Current Outpatient Medications:   •  amLODIPine (NORVASC) 10 MG tablet, , Disp: , Rfl:     No Known Allergies    Review of Systems   Constitutional: Negative for diaphoresis, fever and unexpected weight change.   HENT: Negative for dental problem and sore throat.    Eyes: Negative for visual disturbance.   Respiratory: " "Negative for shortness of breath.    Cardiovascular: Negative for chest pain.   Gastrointestinal: Negative for abdominal pain, constipation, diarrhea, nausea and vomiting.   Genitourinary: Negative for difficulty urinating and frequency.   Musculoskeletal: Positive for arthralgias (right hip. leg) and back pain.   Neurological: Negative for headaches.   Hematological: Does not bruise/bleed easily.       I have reviewed the medical and surgical history, family history, social history, medications, and/or allergies, and the review of systems of this report.    Objective   Resp 18   Ht 182.9 cm (72\")   Wt 125 kg (275 lb)   BMI 37.30 kg/m²    Physical Exam  Vitals and nursing note reviewed.   Constitutional:       Appearance: Normal appearance.   Cardiovascular:      Pulses: Normal pulses.   Pulmonary:      Effort: Pulmonary effort is normal. No respiratory distress.   Musculoskeletal:      Lumbar back: Tenderness and bony tenderness present. No spasms.      Right hip: No deformity, tenderness, bony tenderness or crepitus. Decreased strength.   Neurological:      Mental Status: He is alert and oriented to person, place, and time.   Psychiatric:         Behavior: Behavior normal.       Right Hip Exam     Range of Motion   Abduction: 35   Flexion: 90     Muscle Strength   Abduction: 4/5   Adduction: 4/5   Flexion: 4/5     Tests   [place]: negative  Fadir:  Negative FADIR test      Left Hip Exam     Muscle Strength   Abduction: 5/5   Adduction: 5/5   Flexion: 5/5            Extremity DVT signs are negative on physical exam with negative Francine sign, no calf pain, no palpable cords and no skin tone change   Neurologic Exam     Mental Status   Oriented to person, place, and time.        Neg  SLR.  + ttp Low back  Neg clonus BLE  Neg foot drop      Assessment/Plan   Independent Review of Radiographic Studies:    Xray of right hip reveals mild arthritic changes without acute fracture.     Xray of lumbar spine reveals multi " level degenerative changes with spurring evident at L2-L3      Procedures       Diagnoses and all orders for this visit:    1. Arthralgia of right hip (Primary)  -     XR Hip With or Without Pelvis 2 - 3 View Right; Future    2. Low back pain with right-sided sciatica, unspecified back pain laterality, unspecified chronicity  -     XR Spine Lumbar 2 or 3 View; Future  -     MRI Lumbar Spine Without Contrast    3. DDD (degenerative disc disease), lumbar  -     MRI Lumbar Spine Without Contrast    4. Numbness and tingling of right leg  -     MRI Lumbar Spine Without Contrast       Orthopedic activities reviewed and patient expressed appreciation  Discussion of orthopedic goals  Risk, benefits, and merits of treatment alternatives reviewed with the patient and questions answered    Recommendations/Plan:  Exercise, medications, injections, other patient advice, and return appointment as noted.  Patient is encouraged to call or return for any issues or concerns.    Patient agreeable to call or return sooner for any concerns.    Continue using cane  Follow up after MRI             EMR Dragon-transcription disclaimer:  This encounter note is an electronic transcription of spoken language to printed text.  Electronic transcription of spoken language may permit erroneous or at times nonsensical words or phrases to be inadvertently transcribed.  Although I have reviewed the note for such errors, some may still exist

## 2021-07-01 ENCOUNTER — HOSPITAL ENCOUNTER (OUTPATIENT)
Dept: MRI IMAGING | Facility: HOSPITAL | Age: 66
Discharge: HOME | End: 2021-07-01
Admitting: PHYSICIAN ASSISTANT

## 2021-07-01 PROCEDURE — 72148 MRI LUMBAR SPINE W/O DYE: CPT

## 2025-08-01 ENCOUNTER — TRANSCRIBE ORDERS (OUTPATIENT)
Dept: ADMINISTRATIVE | Facility: HOSPITAL | Age: 70
End: 2025-08-01
Payer: COMMERCIAL

## 2025-08-01 DIAGNOSIS — M54.50 LOW BACK PAIN, UNSPECIFIED BACK PAIN LATERALITY, UNSPECIFIED CHRONICITY, UNSPECIFIED WHETHER SCIATICA PRESENT: Primary | ICD-10-CM

## 2025-08-01 DIAGNOSIS — S32.040G CLOSED COMPRESSION FRACTURE OF L4 LUMBAR VERTEBRA, WITH DELAYED HEALING, SUBSEQUENT ENCOUNTER: ICD-10-CM

## 2025-08-01 DIAGNOSIS — M43.16 SPONDYLOLISTHESIS OF LUMBAR REGION: ICD-10-CM

## 2025-08-01 DIAGNOSIS — M48.56XA LUMBAR VERTEBRAL COLLAPSE, INITIAL ENCOUNTER: ICD-10-CM

## 2025-08-01 DIAGNOSIS — M51.362 DEGENERATION OF INTERVERTEBRAL DISC OF LUMBAR REGION WITH DISCOGENIC BACK PAIN AND LOWER EXTREMITY PAIN: ICD-10-CM

## 2025-08-21 ENCOUNTER — HOSPITAL ENCOUNTER (OUTPATIENT)
Dept: BONE DENSITY | Facility: HOSPITAL | Age: 70
Discharge: HOME | End: 2025-08-21
Payer: MEDICARE

## 2025-08-21 DIAGNOSIS — M51.362 DEGENERATION OF INTERVERTEBRAL DISC OF LUMBAR REGION WITH DISCOGENIC BACK PAIN AND LOWER EXTREMITY PAIN: ICD-10-CM

## 2025-08-21 DIAGNOSIS — M54.50 LOW BACK PAIN, UNSPECIFIED BACK PAIN LATERALITY, UNSPECIFIED CHRONICITY, UNSPECIFIED WHETHER SCIATICA PRESENT: ICD-10-CM

## 2025-08-21 DIAGNOSIS — M48.56XA LUMBAR VERTEBRAL COLLAPSE, INITIAL ENCOUNTER: ICD-10-CM

## 2025-08-21 DIAGNOSIS — S32.040G CLOSED COMPRESSION FRACTURE OF L4 LUMBAR VERTEBRA, WITH DELAYED HEALING, SUBSEQUENT ENCOUNTER: ICD-10-CM

## 2025-08-21 DIAGNOSIS — M43.16 SPONDYLOLISTHESIS OF LUMBAR REGION: ICD-10-CM

## 2025-08-21 PROCEDURE — 77080 DXA BONE DENSITY AXIAL: CPT
